# Patient Record
Sex: FEMALE | URBAN - METROPOLITAN AREA
[De-identification: names, ages, dates, MRNs, and addresses within clinical notes are randomized per-mention and may not be internally consistent; named-entity substitution may affect disease eponyms.]

---

## 2020-11-19 ENCOUNTER — OFFICE VISIT (OUTPATIENT)
Dept: URBAN - METROPOLITAN AREA CLINIC 25 | Facility: CLINIC | Age: 60
End: 2020-11-19

## 2020-12-01 ENCOUNTER — OFFICE VISIT (OUTPATIENT)
Dept: URBAN - METROPOLITAN AREA CLINIC 98 | Facility: CLINIC | Age: 60
End: 2020-12-01

## 2021-01-05 ENCOUNTER — OFFICE VISIT (OUTPATIENT)
Dept: URBAN - METROPOLITAN AREA CLINIC 98 | Facility: CLINIC | Age: 61
End: 2021-01-05

## 2021-02-04 ENCOUNTER — OFFICE VISIT (OUTPATIENT)
Dept: URBAN - METROPOLITAN AREA CLINIC 25 | Facility: CLINIC | Age: 61
End: 2021-02-04
Payer: COMMERCIAL

## 2021-02-04 VITALS
DIASTOLIC BLOOD PRESSURE: 75 MMHG | HEIGHT: 62 IN | HEART RATE: 57 BPM | TEMPERATURE: 96.9 F | SYSTOLIC BLOOD PRESSURE: 128 MMHG | WEIGHT: 170 LBS | BODY MASS INDEX: 31.28 KG/M2

## 2021-02-04 DIAGNOSIS — R12 HEARTBURN: ICD-10-CM

## 2021-02-04 DIAGNOSIS — R10.32 LLQ ABDOMINAL PAIN: ICD-10-CM

## 2021-02-04 DIAGNOSIS — K21.9 GERD: ICD-10-CM

## 2021-02-04 DIAGNOSIS — R07.9 CHEST PAIN: ICD-10-CM

## 2021-02-04 PROBLEM — 235595009 GASTROESOPHAGEAL REFLUX DISEASE: Status: ACTIVE | Noted: 2021-02-04

## 2021-02-04 PROCEDURE — G8417 CALC BMI ABV UP PARAM F/U: HCPCS | Performed by: INTERNAL MEDICINE

## 2021-02-04 PROCEDURE — 99204 OFFICE O/P NEW MOD 45 MIN: CPT | Performed by: INTERNAL MEDICINE

## 2021-02-04 PROCEDURE — G8482 FLU IMMUNIZE ORDER/ADMIN: HCPCS | Performed by: INTERNAL MEDICINE

## 2021-02-04 PROCEDURE — G8427 DOCREV CUR MEDS BY ELIG CLIN: HCPCS | Performed by: INTERNAL MEDICINE

## 2021-02-04 PROCEDURE — 3017F COLORECTAL CA SCREEN DOC REV: CPT | Performed by: INTERNAL MEDICINE

## 2021-02-04 PROCEDURE — 1036F TOBACCO NON-USER: CPT | Performed by: INTERNAL MEDICINE

## 2021-02-04 RX ORDER — SIMVASTATIN 40 MG/1
1 TABLET IN THE EVENING TABLET, FILM COATED ORAL ONCE A DAY
Status: ACTIVE | COMMUNITY

## 2021-02-04 RX ORDER — LOSARTAN POTASSIUM 50 MG/1
1 TABLET TABLET ORAL ONCE A DAY
Status: ACTIVE | COMMUNITY

## 2021-02-04 RX ORDER — PANTOPRAZOLE SODIUM 40 MG/1
1 TABLET TABLET, DELAYED RELEASE ORAL ONCE A DAY
Status: ACTIVE | COMMUNITY

## 2021-02-04 RX ORDER — TRAZODONE HYDROCHLORIDE 150 MG/1
1 TABLET AT BEDTIME TABLET ORAL ONCE A DAY
Status: ACTIVE | COMMUNITY

## 2021-02-04 NOTE — HPI-TODAY'S VISIT:
Patient presents with c/o LLQ abd pain with constipation along with postprandial epigastric pain and reflux with heartburn and chest pain. Cardiac w/u yesterday was negative per pt. She is presently on pantoprazole. Constipation described as hard stool which is responding to stool softner. Pt stated she had a normal colonoscopy last r in [place].

## 2021-02-10 ENCOUNTER — OFFICE VISIT (OUTPATIENT)
Dept: URBAN - METROPOLITAN AREA SURGERY CENTER 20 | Facility: SURGERY CENTER | Age: 61
End: 2021-02-10

## 2021-02-12 ENCOUNTER — OFFICE VISIT (OUTPATIENT)
Dept: URBAN - METROPOLITAN AREA SURGERY CENTER 20 | Facility: SURGERY CENTER | Age: 61
End: 2021-02-12
Payer: COMMERCIAL

## 2021-02-12 ENCOUNTER — OFFICE VISIT (OUTPATIENT)
Dept: URBAN - METROPOLITAN AREA SURGERY CENTER 20 | Facility: SURGERY CENTER | Age: 61
End: 2021-02-12

## 2021-02-12 DIAGNOSIS — K22.8 COLUMNAR-LINED ESOPHAGUS: ICD-10-CM

## 2021-02-12 DIAGNOSIS — K29.30 CHRONIC SUPERFICIAL GASTRITIS: ICD-10-CM

## 2021-02-12 PROCEDURE — 43239 EGD BIOPSY SINGLE/MULTIPLE: CPT | Performed by: INTERNAL MEDICINE

## 2021-02-12 PROCEDURE — G8907 PT DOC NO EVENTS ON DISCHARG: HCPCS | Performed by: INTERNAL MEDICINE

## 2021-02-16 ENCOUNTER — TELEPHONE ENCOUNTER (OUTPATIENT)
Dept: URBAN - METROPOLITAN AREA CLINIC 25 | Facility: CLINIC | Age: 61
End: 2021-02-16

## 2021-02-19 ENCOUNTER — OFFICE VISIT (OUTPATIENT)
Dept: URBAN - METROPOLITAN AREA SURGERY CENTER 20 | Facility: SURGERY CENTER | Age: 61
End: 2021-02-19

## 2021-02-22 ENCOUNTER — TELEPHONE ENCOUNTER (OUTPATIENT)
Dept: URBAN - METROPOLITAN AREA CLINIC 23 | Facility: CLINIC | Age: 61
End: 2021-02-22

## 2021-03-01 ENCOUNTER — OFFICE VISIT (OUTPATIENT)
Dept: URBAN - METROPOLITAN AREA CLINIC 84 | Facility: CLINIC | Age: 61
End: 2021-03-01

## 2021-03-01 RX ORDER — TRAZODONE HYDROCHLORIDE 150 MG/1
1 TABLET AT BEDTIME TABLET ORAL ONCE A DAY
Status: ACTIVE | COMMUNITY

## 2021-03-01 RX ORDER — LOSARTAN POTASSIUM 50 MG/1
1 TABLET TABLET ORAL ONCE A DAY
Status: ACTIVE | COMMUNITY

## 2021-03-01 RX ORDER — PANTOPRAZOLE SODIUM 40 MG/1
1 TABLET TABLET, DELAYED RELEASE ORAL ONCE A DAY
Status: ACTIVE | COMMUNITY

## 2021-03-01 RX ORDER — SIMVASTATIN 40 MG/1
1 TABLET IN THE EVENING TABLET, FILM COATED ORAL ONCE A DAY
Status: ACTIVE | COMMUNITY

## 2021-03-19 ENCOUNTER — DASHBOARD ENCOUNTERS (OUTPATIENT)
Age: 61
End: 2021-03-19

## 2021-03-25 ENCOUNTER — OFFICE VISIT (OUTPATIENT)
Dept: URBAN - METROPOLITAN AREA TELEHEALTH 2 | Facility: TELEHEALTH | Age: 61
End: 2021-03-25

## 2021-03-25 RX ORDER — SIMVASTATIN 40 MG/1
1 TABLET IN THE EVENING TABLET, FILM COATED ORAL ONCE A DAY
COMMUNITY

## 2021-03-25 RX ORDER — TRAZODONE HYDROCHLORIDE 150 MG/1
1 TABLET AT BEDTIME TABLET ORAL ONCE A DAY
COMMUNITY

## 2021-03-25 RX ORDER — LOSARTAN POTASSIUM 50 MG/1
1 TABLET TABLET ORAL ONCE A DAY
COMMUNITY

## 2021-03-25 RX ORDER — PANTOPRAZOLE SODIUM 40 MG/1
1 TABLET TABLET, DELAYED RELEASE ORAL ONCE A DAY
COMMUNITY

## 2021-03-30 ENCOUNTER — OFFICE VISIT (OUTPATIENT)
Dept: URBAN - METROPOLITAN AREA CLINIC 25 | Facility: CLINIC | Age: 61
End: 2021-03-30

## 2021-03-30 RX ORDER — PANTOPRAZOLE SODIUM 40 MG/1
1 TABLET TABLET, DELAYED RELEASE ORAL ONCE A DAY
COMMUNITY

## 2021-03-30 RX ORDER — LOSARTAN POTASSIUM 50 MG/1
1 TABLET TABLET ORAL ONCE A DAY
COMMUNITY

## 2021-03-30 RX ORDER — TRAZODONE HYDROCHLORIDE 150 MG/1
1 TABLET AT BEDTIME TABLET ORAL ONCE A DAY
COMMUNITY

## 2021-03-30 RX ORDER — SIMVASTATIN 40 MG/1
1 TABLET IN THE EVENING TABLET, FILM COATED ORAL ONCE A DAY
COMMUNITY